# Patient Record
Sex: MALE | Race: BLACK OR AFRICAN AMERICAN | NOT HISPANIC OR LATINO | Employment: OTHER | URBAN - NONMETROPOLITAN AREA
[De-identification: names, ages, dates, MRNs, and addresses within clinical notes are randomized per-mention and may not be internally consistent; named-entity substitution may affect disease eponyms.]

---

## 2023-06-13 ENCOUNTER — HOSPITAL ENCOUNTER (OUTPATIENT)
Dept: PHYSICAL THERAPY | Facility: HOSPITAL | Age: 53
Setting detail: THERAPIES SERIES
Discharge: HOME | End: 2023-06-13
Payer: MEDICAID

## 2023-06-13 DIAGNOSIS — M47.814 OSTEOARTHRITIS OF THORACIC SPINE, UNSPECIFIED SPINAL OSTEOARTHRITIS COMPLICATION STATUS: ICD-10-CM

## 2023-06-13 DIAGNOSIS — M47.812 OSTEOARTHRITIS OF CERVICAL SPINE, UNSPECIFIED SPINAL OSTEOARTHRITIS COMPLICATION STATUS: Primary | ICD-10-CM

## 2023-06-13 PROCEDURE — 97110 THERAPEUTIC EXERCISES: CPT | Performed by: PHYSICAL THERAPIST

## 2023-06-13 PROCEDURE — 97162 PT EVAL MOD COMPLEX 30 MIN: CPT | Performed by: PHYSICAL THERAPIST
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
## 2023-06-14 ENCOUNTER — TRANSCRIBE ORDERS (OUTPATIENT)
Dept: PHYSICAL THERAPY | Facility: HOSPITAL | Age: 53
End: 2023-06-14
Payer: MEDICAID

## 2023-06-14 DIAGNOSIS — M47.814 OSTEOARTHRITIS OF THORACIC SPINE, UNSPECIFIED SPINAL OSTEOARTHRITIS COMPLICATION STATUS: ICD-10-CM

## 2023-06-14 DIAGNOSIS — M47.812 OSTEOARTHRITIS OF CERVICAL SPINE, UNSPECIFIED SPINAL OSTEOARTHRITIS COMPLICATION STATUS: Primary | ICD-10-CM

## 2023-06-15 ENCOUNTER — HOSPITAL ENCOUNTER (OUTPATIENT)
Dept: PHYSICAL THERAPY | Facility: HOSPITAL | Age: 53
Setting detail: THERAPIES SERIES
Discharge: HOME | End: 2023-06-15
Payer: MEDICAID

## 2023-06-15 DIAGNOSIS — M47.812 OSTEOARTHRITIS OF CERVICAL SPINE, UNSPECIFIED SPINAL OSTEOARTHRITIS COMPLICATION STATUS: Primary | ICD-10-CM

## 2023-06-15 DIAGNOSIS — M47.814 OSTEOARTHRITIS OF THORACIC SPINE, UNSPECIFIED SPINAL OSTEOARTHRITIS COMPLICATION STATUS: ICD-10-CM

## 2023-06-15 PROCEDURE — 97110 THERAPEUTIC EXERCISES: CPT | Performed by: PHYSICAL THERAPIST

## 2023-06-15 NOTE — THERAPY TREATMENT NOTE
Outpatient Physical Therapy Ortho Treatment Note  [facility]     Patient Name: Tai Bey  : 1970  MRN: [id number]  Today's Date: 6/15/2023      Visit Date: 06/15/2023    Patient seen for 2 PT sessions.  Patient reports N/A% of improvement.  Next MD appt: YESSI.  Recertification: 2023.     Therapy Diagnosis: C-T Osteoarthritis/Pain/Postural Dysfunction       Past Medical History:   Diagnosis Date    Anxiety     Arthritis     Hypertension         Past Surgical History:   Procedure Laterality Date    FRACTURE SURGERY         Visit Dx:     ICD-10-CM ICD-9-CM   1. Osteoarthritis of cervical spine, unspecified spinal osteoarthritis complication status  M47.812 721.0   2. Osteoarthritis of thoracic spine, unspecified spinal osteoarthritis complication status  M47.814 721.2              PT Ortho       Row Name 06/15/23 1600       Subjective Comments    Subjective Comments Patient reports he is doing okay. he reports everything feels about the same but denies any pain.  -AJ       Precautions and Contraindications    Precautions/Limitations no known precautions/limitations  -AJ       Subjective Pain    Able to rate subjective pain? yes  -AJ    Pre-Treatment Pain Level 0  -AJ    Post-Treatment Pain Level 0  -AJ              User Key  (r) = Recorded By, (t) = Taken By, (c) = Cosigned By      Initials Name Provider Type    Laly Tavarez, PT DPT Physical Therapist                                Therapy Education  Education Details: HEP: add alt cervical SB isometrics  Given: HEP, Posture/body mechanics  Program: New  How Provided: Verbal, Demonstration, Written  Provided to: Patient  Level of Understanding: Teach back education performed, Verbalized, Demonstrated      PT OP Goals       Row Name 06/15/23 1600          PT Short Term Goals    STG 1 I with HEP and have additions/changes by next re-certification.  -AJ     STG 1 Progress Partially Met;Ongoing  -AJ     STG 2 Patient to be more aware of  posture and posture correction technique.  -     STG 2 Progress Ongoing;Progressing  -     STG 3 AROM cervical flexion >= 45°  -     STG 4 AROM B cervical SB >= 40°.  -     STG 5 AROM B cervical ROT >=65°.  -     STG 6 AROM thoracic ROT WNL B.  -        Long Term Goals    LTG 1 AROM for the cervical spine all WNL, no increase in pain.  -     LTG 2 B UE 5/5, no increase in pain.  -     LTG 3 C-spine 5/5, no increase in pain.  -     LTG 4 Patient able to perform 10 minutes of scapular stabilization exercises with good posture and no cueing to correct.  -     LTG 5 Patient able to control s/s with there ex.  -     LTG 6 I with final HEP.  -        Time Calculation    PT Goal Re-Cert Due Date 07/05/23  -               User Key  (r) = Recorded By, (t) = Taken By, (c) = Cosigned By      Initials Name Provider Type    Laly Tavarez, PT DPT Physical Therapist                     PT Assessment/Plan       Row Name 06/15/23 1600          PT Assessment    Assessment Comments Patient had good recall of HEP with only mild cueing for technique and not to over stretch. Good effort of new exercises, some postural cues needed. No complaints with any exercise. Added to HEP.  -        PT Plan    PT Frequency 2x/week  -     PT Plan Comments Add open books next session.  -               User Key  (r) = Recorded By, (t) = Taken By, (c) = Cosigned By      Initials Name Provider Type    Laly Tavarez, PT DPT Physical Therapist                       OP Exercises       Row Name 06/15/23 1600             Subjective Comments    Subjective Comments Patient reports he is doing okay. he reports everything feels about the same but denies any pain.  -         Subjective Pain    Able to rate subjective pain? yes  -      Pre-Treatment Pain Level 0  -      Post-Treatment Pain Level 0  -         Exercise 1    Exercise Name 1 Pro II UE F/R- endurance/posture  -      Time 1 10 min  -       "Additional Comments L 4.0  -AJ         Exercise 2    Exercise Name 2 B UT S  -AJ      Reps 2 2  -AJ      Time 2 30 seconds  -AJ         Exercise 3    Exercise Name 3 B LS S  -AJ      Reps 3 2  -AJ      Time 3 30 seconds  -AJ         Exercise 4    Exercise Name 4 Elephant S  -AJ      Reps 4 2  -AJ      Time 4 30 seconds  -AJ         Exercise 5    Exercise Name 5 Posterior shoulder rolls between exercises  -AJ      Reps 5 ~10  -AJ         Exercise 6    Exercise Name 6 Seated No $  -AJ      Sets 6 2  -AJ      Reps 6 10  -AJ      Time 6 5\" hold  -AJ      Additional Comments RTB  -AJ                User Key  (r) = Recorded By, (t) = Taken By, (c) = Cosigned By      Initials Name Provider Type    Laly Tavarez, PT DPT Physical Therapist                All therapeutic interventions performed today were to address current functional limitations and/or deficits in addressing all physical therapy goals.                              Time Calculation:     Start Time: 1604  Stop Time: 1644  Time Calculation (min): 40 min  Total Timed Code Minutes- PT: 40 minute(s)     Therapy Charges for Today       Code Description Service Date Service Provider Modifiers Qty    09517142007 HC PT THER SUPP EA 15 MIN 6/15/2023 Laly Almonte, PT DPT GP 1    32638872008 HC PT THER PROC EA 15 MIN 6/15/2023 Laly Almonte, PT DPT GP 3                    This document has been electronically signed by Laly Almonte PT DPT, [facility] on Vera 15, 2023 16:49 CDT          "

## 2023-07-06 ENCOUNTER — APPOINTMENT (OUTPATIENT)
Dept: PHYSICAL THERAPY | Facility: HOSPITAL | Age: 53
End: 2023-07-06
Payer: MEDICAID

## 2023-07-20 ENCOUNTER — APPOINTMENT (OUTPATIENT)
Dept: PHYSICAL THERAPY | Facility: HOSPITAL | Age: 53
End: 2023-07-20
Payer: MEDICAID

## 2023-07-27 ENCOUNTER — APPOINTMENT (OUTPATIENT)
Dept: PHYSICAL THERAPY | Facility: HOSPITAL | Age: 53
End: 2023-07-27
Payer: MEDICAID

## 2023-08-03 ENCOUNTER — APPOINTMENT (OUTPATIENT)
Dept: PHYSICAL THERAPY | Facility: HOSPITAL | Age: 53
End: 2023-08-03
Payer: MEDICAID

## 2023-08-10 ENCOUNTER — APPOINTMENT (OUTPATIENT)
Dept: PHYSICAL THERAPY | Facility: HOSPITAL | Age: 53
End: 2023-08-10
Payer: MEDICAID

## 2023-08-21 ENCOUNTER — HOSPITAL ENCOUNTER (OUTPATIENT)
Dept: PHYSICAL THERAPY | Facility: HOSPITAL | Age: 53
Setting detail: THERAPIES SERIES
Discharge: HOME | End: 2023-08-21
Payer: MEDICAID

## 2023-08-21 DIAGNOSIS — M47.812 OSTEOARTHRITIS OF CERVICAL SPINE, UNSPECIFIED SPINAL OSTEOARTHRITIS COMPLICATION STATUS: Primary | ICD-10-CM

## 2023-08-21 DIAGNOSIS — M47.814 OSTEOARTHRITIS OF THORACIC SPINE, UNSPECIFIED SPINAL OSTEOARTHRITIS COMPLICATION STATUS: ICD-10-CM

## 2023-08-21 PROCEDURE — 97530 THERAPEUTIC ACTIVITIES: CPT | Performed by: PHYSICAL THERAPIST

## 2023-08-21 PROCEDURE — 97110 THERAPEUTIC EXERCISES: CPT | Performed by: PHYSICAL THERAPIST

## 2023-08-21 NOTE — THERAPY DISCHARGE NOTE
"   Outpatient Physical Therapy Ortho Progress Note/Discharge Summary  [facility]     Patient Name: Tai Bey  : 1970  MRN: [id number]  Today's Date: 2023      Visit Date: 2023    Patient seen for 6 PT sessions.  Patient reports 75% of improvement.  Next MD appt: YESSI  Recertification: N/A.    Therapy Diagnosis: C-T Osteoarthritis/Pain/Postural Dysfunction       Visit Dx:    ICD-10-CM ICD-9-CM   1. Osteoarthritis of cervical spine, unspecified spinal osteoarthritis complication status  M47.812 721.0   2. Osteoarthritis of thoracic spine, unspecified spinal osteoarthritis complication status  M47.814 721.2       There is no problem list on file for this patient.       Past Medical History:   Diagnosis Date    Anxiety     Arthritis     Hypertension         Past Surgical History:   Procedure Laterality Date    FRACTURE SURGERY          PT Ortho       Row Name 23 1300       Subjective Comments    Subjective Comments Patient reports he has had some pain on/off. he reports he continues ot garden.  -AJ       Precautions and Contraindications    Precautions/Limitations no known precautions/limitations  -       Subjective Pain    Post-Treatment Pain Level --  \"feels pretty good\"  -AJ       Posture/Observations    Alignment Options Forward head;Cervical lordosis;Thoracic kyphosis;Rounded shoulders  -AJ    Forward Head Mild;Moderate;Increased  -AJ    Cervical Lordosis Mild;Moderate;Increased  -AJ    Thoracic Kyphosis Mild;Increased  -AJ    Rounded Shoulders Bilateral:;Mild;Increased  -AJ    Posture/Observations Comments No dstress, improved overall postural awareness.  -       Sensory Screen for Light Touch- Upper Quarter Clearing    C4 (posterior shoulder) Bilateral:;Intact  -AJ    C5 (lateral upper arm) Bilateral:;Intact  -AJ    C6 (tip of thumb) Bilateral:;Intact  -AJ    C7 (tip of 3rd finger) Bilateral:;Intact  -AJ    C8 (tip of 5th finger) Bilateral:;Intact  -AJ    T1 (medial lower arm) " "Bilateral:;Intact  -AJ       Cervical Palpation    Upper Traps Left:;Tender  -AJ       Head/Neck/Trunk    Neck Extension AROM 70ø  -AJ    Neck Flexion AROM 65ø  -AJ    Neck Lt Lateral Flexion AROM 45ø  -AJ    Neck Rt Lateral Flexion AROM 40ø  -AJ    Neck Lt Rotation AROM 78ø  -AJ    Neck Rt Rotation AROM 75ø  -AJ    Trunk Lt Rotation AROM 100% of range, WNL  -AJ    Trunk Rt Rotation AROM 100% of range, WNL  -AJ       MMT (Manual Muscle Testing)    General MMT Comments b UE 5/5, no change in pain. C-spine 5/5, \"tightness\"  -AJ       Sensation    Sensation WNL? WNL  -AJ    Light Touch No apparent deficits  -AJ       Upper Extremity Flexibility    Upper Trapezius Bilateral:;Mildly limited  -AJ    Levator Scapula Bilateral:;Mildly limited  -AJ       Pathomechanics    Spine Pathomechanics Hinges into extension in lower cervical  -AJ       Transfers    Comment, (Transfers) I with all transfers.  -AJ       Gait/Stairs (Locomotion)    Comment, (Gait/Stairs) FWB, non-antalgic gait, no assistive device, no significant deviations noted, normal arm swing with gait.  -AJ              User Key  (r) = Recorded By, (t) = Taken By, (c) = Cosigned By      Initials Name Provider Type    AJ Laly Almonte, PT DPT Physical Therapist                      Barriers to Rehab: Include significant or possible arthritic/degenerative changes that have occurred within the spine, The chronicity of this issue.     Safety Issues: None noted.                 PT Assessment/Plan       Row Name 08/21/23 1300          PT Assessment    Functional Limitations Performance in leisure activities  -AJ     Impairments Impaired muscle endurance;Impaired postural alignment;Joint integrity;Pain  -AJ     Assessment Comments Patient met all goals and I with final HEP.  -AJ     Please refer to paper survey for additional self-reported information Yes  -AJ     Rehab Potential Fair  -AJ     Patient/caregiver participated in establishment of treatment plan and " "goals Yes  -AJ     Patient would benefit from skilled therapy intervention No  -AJ        PT Plan    PT Frequency --  N/A  -AJ     PT Plan Comments D/C today with final HEP.  -AJ               User Key  (r) = Recorded By, (t) = Taken By, (c) = Cosigned By      Initials Name Provider Type    Laly Tavarez, PT DPT Physical Therapist                         OP Exercises       Row Name 08/21/23 1300             Subjective Comments    Subjective Comments Patient reports he has had some pain on/off. he reports he continues ot garden.  -AJ         Subjective Pain    Able to rate subjective pain? yes  -AJ      Pre-Treatment Pain Level 4  -AJ      Post-Treatment Pain Level --  \"feels pretty good\"  -AJ         Exercise 1    Exercise Name 1 Pro II UE F/R- endurance/posture  -AJ      Time 1 10 min  -AJ      Additional Comments : 6.0  -AJ         Exercise 2    Exercise Name 2 B UT S  -AJ      Reps 2 2  -AJ      Time 2 30 seconds  -AJ         Exercise 3    Exercise Name 3 B LS S  -AJ      Reps 3 2  -AJ      Time 3 30 seconds  -AJ         Exercise 4    Exercise Name 4 Elephant S  -AJ      Reps 4 2  -AJ      Time 4 30 seconds  -AJ         Exercise 5    Exercise Name 5 measurements  -AJ         Exercise 6    Exercise Name 6 Chin Tucks  -AJ      Sets 6 1  -AJ      Reps 6 20  -AJ      Time 6 5\" hold  -AJ         Exercise 7    Exercise Name 7 Swims  -AJ      Sets 7 1  -AJ      Reps 7 10  -AJ      Time 7 5\" hold end of range  -AJ         Exercise 8    Exercise Name 8 Tband clocks 3-way  -AJ      Sets 8 1  -AJ      Reps 8 10  -AJ      Time 8 5\" hold  -AJ      Additional Comments GTB  -AJ         Exercise 9    Exercise Name 9 Tband no $  -AJ      Sets 9 1  -AJ      Reps 9 20  -AJ      Time 9 5\" hold  -AJ      Additional Comments GTB  -AJ         Exercise 10    Exercise Name 10 Cerical isometrics: alt SB, flex, ext  -AJ      Sets 10 1  -AJ      Reps 10 10 each  -AJ      Time 10 5\" hold  -AJ         Exercise 11    Exercise Name " "11 Tband Rows: Low/Mid  -      Sets 11 1  -      Reps 11 20 each  -      Additional Comments BTB  -         Exercise 12    Exercise Name 12 Tband B shoulder ext  -      Reps 12 1  -      Time 12 20  -      Additional Comments [facility]  -         Exercise 13    Exercise Name 13 B Open Books  -      Sets 13 1  -      Reps 13 10 each side  -      Time 13 5-10\" hold  -                User Key  (r) = Recorded By, (t) = Taken By, (c) = Cosigned By      Initials Name Provider Type    Laly Tavarez, PT DPT Physical Therapist                  All therapeutic interventions performed today were to address current functional limitations and/or deficits in addressing all physical therapy goals.                    PT OP Goals       Row Name 08/21/23 1300          PT Short Term Goals    STG 1 I with HEP and have additions/changes by next re-certification.  -     STG 1 Progress Met  -     STG 2 Patient to be more aware of posture and posture correction technique.  -     STG 2 Progress Met  -     STG 3 AROM cervical flexion >= 45ø  -     STG 3 Progress Met  -     STG 4 AROM B cervical SB >= 40ø.  -     STG 4 Progress Met  -     STG 5 AROM B cervical ROT >=65ø.  -     STG 5 Progress Met  -     STG 6 AROM thoracic ROT WNL B.  -     STG 6 Progress Met  -        Long Term Goals    LTG 1 AROM for the cervical spine all WNL, no increase in pain.  -     LTG 1 Progress Met  -     LTG 2 B UE 5/5, no increase in pain.  -     LTG 2 Progress Met  -     LTG 3 C-spine 5/5, no increase in pain.  -     LTG 3 Progress Met  -     LTG 4 Patient able to perform 10 minutes of scapular stabilization exercises with good posture and no cueing to correct.  -     LTG 4 Progress Met  -     LTG 5 Patient able to control s/s with there ex.  -     LTG 5 Progress Met  -     LTG 6 I with final HEP.  -     LTG 6 Progress Met  -        Time Calculation    PT Goal Re-Cert Due Date --  N/A  " -AJ               User Key  (r) = Recorded By, (t) = Taken By, (c) = Cosigned By      Initials Name Provider Type    Laly Tavarez, PT DPT Physical Therapist                    Therapy Education  Given: HEP, Symptoms/condition management, Pain management, Posture/body mechanics, Other (comment) (POC)  Program: Reinforced, Progressed  How Provided: Verbal, Demonstration  Provided to: Patient  Level of Understanding: Teach back education performed, Verbalized, Demonstrated    Outcome Measure Options: Neck Disability Index (NDI), Modified Oswestry  Modified Oswestry  Modified Oswestry Score/Comments: 14/50 = 28%  Neck Disability Index  Section 1 - Pain Intensity: The pain comes and goes and is moderate.  Section 2 - Personal Care: I can look after myself normally without causing extra pain.  Section 3 - Lifting: I can lift heavy weights, but it causes extra pain.  Section 4 - Work: I can do most of my usual work, but no more  Section 5 - Headaches: I have slight headaches that come infrequently.  Section 6 - Concentration: I have a fair degree of difficulty in concentrating when I want to.  Section 7 - Sleeping: My sleep is mildly disturbed (1-2 hours sleepless).  Section 8 - Driving: I can drive as long as I want with moderate neck pain.  Section 9 - Reading: I can read as much as I want with moderate neck pain.  Section 10 - Recreation: I am able to engage in most but not all recreational activities because of pain in my neck.  Neck Disability Index Score: 16  Neck Disability Index Comments: 16 = 32%      Time Calculation:   Start Time: 1342  Stop Time: 1435  Time Calculation (min): 53 min  Total Timed Code Minutes- PT: 53 minute(s)  Therapy Charges for Today       Code Description Service Date Service Provider Modifiers Qty    69835771686  PT THER SUPP EA 15 MIN 8/21/2023 Laly Almonte, PT DPT GP 1    [id number] HC PT THER PROC EA 15 MIN 8/21/2023 Laly Almonte, PT DPT GP 3     [id number]  PT THERAPEUTIC ACT EA 15 MIN 8/21/2023 Laly Almonte, PT DPT GP 1            PT G-Codes  Outcome Measure Options: Neck Disability Index (NDI), Modified Oswestry  Modified Oswestry Score/Comments: 14/50 = 28%  Neck Disability Index Score: 16     OP PT Discharge Summary  Date of Discharge: 08/21/23  Reason for Discharge: All goals achieved, Independent  Outcomes Achieved: Able to achieve all goals within established timeline, Refer to plan of care for updates on goals achieved      This document has been electronically signed by Laly Almonte, PT DPT, CSCS on August 21, 2023 14:42 CDT